# Patient Record
Sex: MALE | Race: WHITE
[De-identification: names, ages, dates, MRNs, and addresses within clinical notes are randomized per-mention and may not be internally consistent; named-entity substitution may affect disease eponyms.]

---

## 2018-02-24 ENCOUNTER — HOSPITAL ENCOUNTER (EMERGENCY)
Dept: HOSPITAL 31 - C.ER | Age: 28
Discharge: HOME | End: 2018-02-24
Payer: SELF-PAY

## 2018-02-24 VITALS — DIASTOLIC BLOOD PRESSURE: 63 MMHG | SYSTOLIC BLOOD PRESSURE: 117 MMHG | HEART RATE: 93 BPM | RESPIRATION RATE: 14 BRPM

## 2018-02-24 VITALS — OXYGEN SATURATION: 96 %

## 2018-02-24 VITALS — TEMPERATURE: 97.8 F

## 2018-02-24 DIAGNOSIS — R07.9: Primary | ICD-10-CM

## 2018-02-24 LAB
ALBUMIN SERPL-MCNC: 4.3 G/DL (ref 3.5–5)
ALBUMIN/GLOB SERPL: 1.4 {RATIO} (ref 1–2.1)
ALT SERPL-CCNC: 35 U/L (ref 21–72)
APTT BLD: 34 SECONDS (ref 21–34)
AST SERPL-CCNC: 34 U/L (ref 17–59)
BASOPHILS # BLD AUTO: 0 K/UL (ref 0–0.2)
BASOPHILS NFR BLD: 0.5 % (ref 0–2)
BUN SERPL-MCNC: 17 MG/DL (ref 9–20)
CALCIUM SERPL-MCNC: 9.8 MG/DL (ref 8.6–10.4)
EOSINOPHIL # BLD AUTO: 0.1 K/UL (ref 0–0.7)
EOSINOPHIL NFR BLD: 0.7 % (ref 0–4)
ERYTHROCYTE [DISTWIDTH] IN BLOOD BY AUTOMATED COUNT: 13.4 % (ref 11.5–14.5)
GFR NON-AFRICAN AMERICAN: > 60
HGB BLD-MCNC: 14.4 G/DL (ref 12–18)
INR PPP: 1
LYMPHOCYTES # BLD AUTO: 2.8 K/UL (ref 1–4.3)
LYMPHOCYTES NFR BLD AUTO: 30.6 % (ref 20–40)
MCH RBC QN AUTO: 30.5 PG (ref 27–31)
MCHC RBC AUTO-ENTMCNC: 34.3 G/DL (ref 33–37)
MCV RBC AUTO: 89 FL (ref 80–94)
MONOCYTES # BLD: 0.6 K/UL (ref 0–0.8)
MONOCYTES NFR BLD: 6.5 % (ref 0–10)
NEUTROPHILS # BLD: 5.7 K/UL (ref 1.8–7)
NEUTROPHILS NFR BLD AUTO: 61.7 % (ref 50–75)
NRBC BLD AUTO-RTO: 0 % (ref 0–2)
PLATELET # BLD: 279 K/UL (ref 130–400)
PMV BLD AUTO: 7 FL (ref 7.2–11.7)
PROTHROMBIN TIME: 11.1 SECONDS (ref 9.7–12.2)
RBC # BLD AUTO: 4.71 MIL/UL (ref 4.4–5.9)
WBC # BLD AUTO: 9.2 K/UL (ref 4.8–10.8)

## 2018-02-24 NOTE — C.PDOC
History Of Present Illness


Patient is a 26 y/o male who presents to the ED with a complaint of chest pain 

for the last two hours while walking. Denies any fever, chills, nausea, vomiting

, or any drug use. No other physical complaints at this time. 


Time Seen by Provider: 02/24/18 00:34


Chief Complaint (Nursing): Chest Pain


History Per: Patient


History/Exam Limitations: no limitations


Onset/Duration Of Symptoms: Hrs (2 hours), Gradual


Current Symptoms Are (Timing): Still Present


Severity: Moderate


Pain Scale Rating Of: 4


Recent travel outside of the [place]: No





Past Medical History


Reviewed: Historical Data, Nursing Documentation, Vital Signs


Vital Signs: 


 Last Vital Signs











Temp  97.8 F   02/24/18 00:22


 


Pulse  93 H  02/24/18 05:52


 


Resp  14   02/24/18 05:52


 


BP  117/63   02/24/18 05:52


 


Pulse Ox  97   02/24/18 05:52














- Medical History


PMH: Bipolar Disorder


Surgical History: Appendectomy


Family History: States: No Known Family Hx





- Social History


Hx Tobacco Use: No


Hx Alcohol Use: Yes


Hx Substance Use: No





- Immunization History


Hx Tetanus Toxoid Vaccination: No


Hx Influenza Vaccination: No


Hx Pneumococcal Vaccination: No





Review Of Systems


Constitutional: Negative for: Fever, Chills


Cardiovascular: Positive for: Chest Pain


Gastrointestinal: Negative for: Nausea, Vomiting





Physical Exam





- Physical Exam


Appears: Well, Non-toxic, No Acute Distress


Skin: Warm, Dry, No Diaphoretic


Head: Normacephalic


Oral Mucosa: Moist


Chest: Symmetrical


Cardiovascular: Rhythm Regular, No Murmur


Respiratory: No Accessory Muscle Use, No Rales, No Rhonchi, No Wheezing


Extremity: No Tenderness, No Pedal Edema


Neurological/Psych: Oriented x3, Normal Speech, Normal Cognition





ED Course And Treatment





- Laboratory Results


Result Diagrams: 


 02/24/18 01:12





 02/24/18 01:12


ECG: Interpreted By Me, Viewed By Me


ECG Rhythm: Sinus Rhythm (107), Nonspecific Changes


O2 Sat by Pulse Oximetry: 96


Pulse Ox Interpretation: Normal





- Radiology


CXR: Interpreted by Me, Viewed By Me


CXR Interpretation: No: Infiltrates, Fracture, Pnemothorax


Progress Note: EKG, CXR, UA, and blood work ordered. Ecotrin administered.  Pt 

is cp free. refused to give urine


Reevaluation Time: 06:32


Reassessment Condition: Improved





Disposition


Counseled Patient/Family Regarding: Studies Performed, Diagnosis, Need For 

Followup





- Disposition


Referrals: 


[facility] [Outside]


[facility] [Outside]


Disposition: HOME/ ROUTINE


Disposition Time: 00:37


Condition: FAIR


Additional Instructions: 


Please return if symptoms recur


Instructions:  Chest Pain (DC)


Forms:  [facility] (English)





- Clinical Impression


Clinical Impression: 


 Chest pain








- Scribe Statement


The provider has reviewed the documentation as recorded by the Scribe





Betsey Gerardo





All medical record entries made by the Scribe were at my direction and 

personally dictated by me. I have reviewed the chart and agree that the record 

accurately reflects my personal performance of the history, physical exam, 

medical decision making, and the department course for this patient. I have 

also personally directed, reviewed, and agree with the discharge instructions 

and disposition.

## 2018-02-24 NOTE — RAD
Chest x-ray single frontal view 



History: Chest pain. 



Comparison: None available. 



Findings: 



No focal infiltrate or effusion. 



Mild right hilar prominence. 



Heart size within normal limits. 



Impression: 



No focal infiltrate or effusion.